# Patient Record
Sex: FEMALE | Race: BLACK OR AFRICAN AMERICAN | NOT HISPANIC OR LATINO | Employment: FULL TIME | URBAN - METROPOLITAN AREA
[De-identification: names, ages, dates, MRNs, and addresses within clinical notes are randomized per-mention and may not be internally consistent; named-entity substitution may affect disease eponyms.]

---

## 2017-08-03 ENCOUNTER — SEE NOTE (OUTPATIENT)
Dept: URBAN - METROPOLITAN AREA CLINIC 31 | Facility: CLINIC | Age: 47
Setting detail: DERMATOLOGY
End: 2017-08-03

## 2017-08-03 PROBLEM — L82.1 OTHER SEBORRHEIC KERATOSIS: Status: RESOLVED | Noted: 2017-08-03

## 2017-08-03 PROCEDURE — 99202 OFFICE O/P NEW SF 15 MIN: CPT

## 2019-10-21 ENCOUNTER — RASH (OUTPATIENT)
Dept: URBAN - METROPOLITAN AREA CLINIC 36 | Facility: CLINIC | Age: 49
Setting detail: DERMATOLOGY
End: 2019-10-21

## 2019-10-21 DIAGNOSIS — B35.3 TINEA PEDIS: ICD-10-CM

## 2019-10-21 DIAGNOSIS — Z79.899 OTHER LONG-TERM (CURRENT) DRUG THERAPY: ICD-10-CM

## 2019-10-21 PROBLEM — R21 RASH AND OTHER NONSPECIFIC SKIN ERUPTION: Status: RESOLVED | Noted: 2019-10-21

## 2019-10-21 PROCEDURE — 99213 OFFICE O/P EST LOW 20 MIN: CPT

## 2019-10-21 RX ORDER — CLOBETASOL PROPIONATE 0.5 MG/G
1 APPLICATION CREAM TOPICAL BID
Qty: 60 | Refills: 0
Start: 2019-10-21

## 2020-12-30 ENCOUNTER — OFFICE VISIT (OUTPATIENT)
Dept: URBAN - METROPOLITAN AREA CLINIC 27 | Facility: CLINIC | Age: 50
End: 2020-12-30

## 2020-12-31 PROBLEM — 429969003 FAMILY HISTORY OF POLYP OF COLON: Status: ACTIVE | Noted: 2020-12-31

## 2020-12-31 PROBLEM — 275978004 SCREENING FOR MALIGNANT NEOPLASM OF COLON: Status: ACTIVE | Noted: 2020-12-31

## 2021-01-06 ENCOUNTER — OFFICE VISIT (OUTPATIENT)
Dept: URBAN - METROPOLITAN AREA SURGERY CENTER 7 | Facility: SURGERY CENTER | Age: 51
End: 2021-01-06

## 2022-04-30 ENCOUNTER — TELEPHONE ENCOUNTER (OUTPATIENT)
Dept: URBAN - METROPOLITAN AREA CLINIC 121 | Facility: CLINIC | Age: 52
End: 2022-04-30

## 2022-05-01 ENCOUNTER — TELEPHONE ENCOUNTER (OUTPATIENT)
Dept: URBAN - METROPOLITAN AREA CLINIC 121 | Facility: CLINIC | Age: 52
End: 2022-05-01

## 2022-05-01 RX ORDER — CHOLINE 650 MG
TABLET ORAL
Status: ACTIVE | COMMUNITY
Start: 2020-12-30

## 2022-05-01 RX ORDER — MULTIVITAMIN
TABLET ORAL
Status: ACTIVE | COMMUNITY
Start: 2020-12-30

## 2022-05-01 RX ORDER — ZINC SULFATE 50(220)MG
CAPSULE ORAL
Status: ACTIVE | COMMUNITY
Start: 2020-12-30

## 2022-08-31 ENCOUNTER — OFFICE VISIT (OUTPATIENT)
Dept: URBAN - METROPOLITAN AREA CLINIC 26 | Facility: CLINIC | Age: 52
End: 2022-08-31
Payer: COMMERCIAL

## 2022-08-31 ENCOUNTER — LAB OUTSIDE AN ENCOUNTER (OUTPATIENT)
Dept: URBAN - METROPOLITAN AREA CLINIC 27 | Facility: CLINIC | Age: 52
End: 2022-08-31

## 2022-08-31 ENCOUNTER — TELEPHONE ENCOUNTER (OUTPATIENT)
Dept: URBAN - METROPOLITAN AREA CLINIC 27 | Facility: CLINIC | Age: 52
End: 2022-08-31

## 2022-08-31 ENCOUNTER — WEB ENCOUNTER (OUTPATIENT)
Dept: URBAN - METROPOLITAN AREA CLINIC 27 | Facility: CLINIC | Age: 52
End: 2022-08-31

## 2022-08-31 ENCOUNTER — DASHBOARD ENCOUNTERS (OUTPATIENT)
Age: 52
End: 2022-08-31

## 2022-08-31 ENCOUNTER — OFFICE VISIT (OUTPATIENT)
Dept: URBAN - METROPOLITAN AREA CLINIC 27 | Facility: CLINIC | Age: 52
End: 2022-08-31
Payer: COMMERCIAL

## 2022-08-31 VITALS
HEART RATE: 91 BPM | WEIGHT: 130 LBS | HEIGHT: 61 IN | DIASTOLIC BLOOD PRESSURE: 91 MMHG | SYSTOLIC BLOOD PRESSURE: 137 MMHG | BODY MASS INDEX: 24.55 KG/M2

## 2022-08-31 DIAGNOSIS — R11.2 NAUSEA & VOMITING: ICD-10-CM

## 2022-08-31 DIAGNOSIS — K30 DYSPEPSIA: ICD-10-CM

## 2022-08-31 DIAGNOSIS — R10.84 GENERALIZED ABDOMINAL PAIN: ICD-10-CM

## 2022-08-31 DIAGNOSIS — R19.7 DIARRHEA: ICD-10-CM

## 2022-08-31 DIAGNOSIS — K62.5 RECTAL BLEEDING: ICD-10-CM

## 2022-08-31 PROCEDURE — 83013 H PYLORI (C-13) BREATH: CPT | Performed by: INTERNAL MEDICINE

## 2022-08-31 PROCEDURE — 83014 H PYLORI DRUG ADMIN: CPT | Performed by: INTERNAL MEDICINE

## 2022-08-31 PROCEDURE — 99204 OFFICE O/P NEW MOD 45 MIN: CPT | Performed by: INTERNAL MEDICINE

## 2022-08-31 RX ORDER — MULTIVITAMIN
TABLET ORAL
Status: ACTIVE | COMMUNITY
Start: 2020-12-30

## 2022-08-31 RX ORDER — HYOSCYAMINE SULFATE 0.12 MG/1
1 OR 2 TABLETS AS NEEDED FOR ABDOMINAL PAIN TABLET ORAL EVERY 6 HOURS
Qty: 90 | Refills: 2 | OUTPATIENT
Start: 2022-08-31 | End: 2022-11-28

## 2022-08-31 RX ORDER — CHOLINE 650 MG
TABLET ORAL
Status: ACTIVE | COMMUNITY
Start: 2020-12-30

## 2022-08-31 RX ORDER — ONDANSETRON HYDROCHLORIDE 4 MG/1
1 TABLET TABLET, FILM COATED ORAL
Qty: 60 | Refills: 2 | OUTPATIENT

## 2022-08-31 RX ORDER — ZINC SULFATE 50(220)MG
CAPSULE ORAL
Status: ACTIVE | COMMUNITY
Start: 2020-12-30

## 2022-08-31 NOTE — HPI-TODAY'S VISIT:
Patient here with complaints of recent rectal bleeding. She had one episode of minor hematochezia on the toilet paper five days ago. This was apparently triggered by an episode of "food poisoning", with nausea/vomiting, gas/bloating, crampy abdominal pain and diarrhea. She denies any trigger for the symptoms, and these symptoms abated on their own after 12 to 16 hours. She apparently has had multiple similar episodes over the past few years, typically twice a year. She has not kept a food diary or a symptom log. The abdominal pain is sharp and is noted over the entire abdomen. It is sometimes nocturnal. The abdominal pain is usually associated with loose stools, bloating/gas and nausea/vomiting. She does not take anything for her symptoms aside from a probiotic. She does have a family history of gallbladder disease. She has not had any recent labs or imaging studies. She last underwent colonoscopy in early 2021; one adenoma was removed. Small internal hemorrhoids and left-sided diverticulosis were seen. She has not had a prior upper endoscopy. There is no family history of colon cancer, but her father had colon polyps.

## 2022-09-01 LAB
A/G RATIO: 1.4
ABSOLUTE BASOPHILS: 42
ABSOLUTE EOSINOPHILS: 100
ABSOLUTE LYMPHOCYTES: 2382
ABSOLUTE MONOCYTES: 490
ABSOLUTE NEUTROPHILS: 5287
ALBUMIN: 4.4
ALKALINE PHOSPHATASE: 100
ALT (SGPT): 10
AMYLASE: 59
AST (SGOT): 22
BASOPHILS: 0.5
BILIRUBIN, TOTAL: 0.8
BUN/CREATININE RATIO: (no result)
BUN: 10
C-REACTIVE PROTEIN, QUANT: 3
CALCIUM: 9.6
CARBON DIOXIDE, TOTAL: 27
CHLORIDE: 102
CREATININE: 0.83
EGFR: 84
EOSINOPHILS: 1.2
GLOBULIN, TOTAL: 3.1
GLUCOSE: 130
HEMATOCRIT: 35.8
HEMOGLOBIN: 12.6
LIPASE: 19
LYMPHOCYTES: 28.7
MCH: 31.3
MCHC: 35.2
MCV: 88.8
MONOCYTES: 5.9
MPV: 10
NEUTROPHILS: 63.7
PLATELET COUNT: 308
POTASSIUM: 4.2
PROTEIN, TOTAL: 7.5
RDW: 12.2
RED BLOOD CELL COUNT: 4.03
SED RATE BY MODIFIED: 2
SODIUM: 139
WHITE BLOOD CELL COUNT: 8.3

## 2022-09-30 ENCOUNTER — TELEPHONE ENCOUNTER (OUTPATIENT)
Dept: URBAN - METROPOLITAN AREA CLINIC 36 | Facility: CLINIC | Age: 52
End: 2022-09-30

## 2022-12-28 PROBLEM — 162031009 DYSPEPSIA: Status: ACTIVE | Noted: 2022-08-31

## 2025-02-10 ENCOUNTER — OFFICE VISIT (OUTPATIENT)
Dept: URBAN - METROPOLITAN AREA CLINIC 27 | Facility: CLINIC | Age: 55
End: 2025-02-10
Payer: COMMERCIAL

## 2025-02-10 ENCOUNTER — TELEPHONE ENCOUNTER (OUTPATIENT)
Dept: URBAN - METROPOLITAN AREA CLINIC 27 | Facility: CLINIC | Age: 55
End: 2025-02-10

## 2025-02-10 ENCOUNTER — LAB OUTSIDE AN ENCOUNTER (OUTPATIENT)
Dept: URBAN - METROPOLITAN AREA CLINIC 27 | Facility: CLINIC | Age: 55
End: 2025-02-10

## 2025-02-10 VITALS
SYSTOLIC BLOOD PRESSURE: 124 MMHG | HEART RATE: 83 BPM | WEIGHT: 136 LBS | BODY MASS INDEX: 25.68 KG/M2 | HEIGHT: 61 IN | DIASTOLIC BLOOD PRESSURE: 87 MMHG

## 2025-02-10 DIAGNOSIS — Z83.719 FAMILY HISTORY OF COLONIC POLYPS: ICD-10-CM

## 2025-02-10 DIAGNOSIS — Z86.0101 HISTORY OF ADENOMATOUS POLYP OF COLON: ICD-10-CM

## 2025-02-10 DIAGNOSIS — R15.0 INCOMPLETE DEFECATION: ICD-10-CM

## 2025-02-10 DIAGNOSIS — K62.5 RECTAL BLEEDING: ICD-10-CM

## 2025-02-10 PROCEDURE — 99203 OFFICE O/P NEW LOW 30 MIN: CPT | Performed by: INTERNAL MEDICINE

## 2025-02-10 RX ORDER — ZINC SULFATE 50(220)MG
CAPSULE ORAL
Status: ACTIVE | COMMUNITY
Start: 2020-12-30

## 2025-02-10 RX ORDER — CHOLINE 650 MG
TABLET ORAL
Status: ACTIVE | COMMUNITY
Start: 2020-12-30

## 2025-02-10 RX ORDER — POLYETHYLENE GLYCOL 3350, SODIUM SULFATE ANHYDROUS, SODIUM BICARBONATE, SODIUM CHLORIDE, POTASSIUM CHLORIDE 236; 22.74; 6.74; 5.86; 2.97 G/4L; G/4L; G/4L; G/4L; G/4L
4000 MILLITERS POWDER, FOR SOLUTION ORAL ONCE
Qty: 4000 MILLILITER | Refills: 0 | OUTPATIENT
Start: 2025-02-10 | End: 2025-02-11

## 2025-02-10 RX ORDER — MULTIVITAMIN
TABLET ORAL
Status: ACTIVE | COMMUNITY
Start: 2020-12-30

## 2025-02-10 RX ORDER — ONDANSETRON HYDROCHLORIDE 4 MG/1
1 TABLET TABLET, FILM COATED ORAL
Qty: 60 | Refills: 2 | Status: DISCONTINUED | COMMUNITY

## 2025-02-10 NOTE — HPI-TODAY'S VISIT:
Patient here with complaints of rectal bleeding that has occurred at least twice over the past few weeks. The first episode was painless. The second episode was associated with straining/passing a hard stool. She had some rectal pain during that episode. During both episodes, the rectal bleed was bright red, noted in the toilet water, and was small-volume. She did not use topical therapy. She denies diarrhea or constipation. She appears to have adequate water intake. She sometimes has the sensation of incomplete evacuation. She has no other GI symptoms currently. She has not had any recent labs. H. pylori breath testing was negative in 2022. She last underwent colonoscopy in early 2021; one adenoma was removed. Small internal hemorrhoids and left-sided diverticulosis were seen. She has not had a prior upper endoscopy. There is no family history of colon cancer, but her father and mother both had colon polyps.

## 2025-02-24 ENCOUNTER — CLAIMS CREATED FROM THE CLAIM WINDOW (OUTPATIENT)
Dept: URBAN - METROPOLITAN AREA SURGERY CENTER 7 | Facility: SURGERY CENTER | Age: 55
End: 2025-02-24
Payer: COMMERCIAL

## 2025-02-24 DIAGNOSIS — K62.5 HEMORRHAGE OF ANUS AND RECTUM: ICD-10-CM

## 2025-02-24 DIAGNOSIS — Z86.0101 PERSONAL HISTORY OF ADENOMATOUS AND SERRATED COLON POLYPS: ICD-10-CM

## 2025-02-24 DIAGNOSIS — Z86.0100 HISTORY OF COLON POLYPS: ICD-10-CM

## 2025-02-24 DIAGNOSIS — K57.30 DIVERTICULOSIS OF LARGE INTESTINE WITHOUT PERFORATION OR ABSCESS WITHOUT BLEEDING: ICD-10-CM

## 2025-02-24 PROCEDURE — 45378 DIAGNOSTIC COLONOSCOPY: CPT | Performed by: INTERNAL MEDICINE

## 2025-02-24 PROCEDURE — 00811 ANES LWR INTST NDSC NOS: CPT | Performed by: NURSE ANESTHETIST, CERTIFIED REGISTERED

## 2025-02-24 RX ORDER — MULTIVITAMIN
TABLET ORAL
Status: ACTIVE | COMMUNITY
Start: 2020-12-30

## 2025-02-24 RX ORDER — CHOLINE 650 MG
TABLET ORAL
Status: ACTIVE | COMMUNITY
Start: 2020-12-30

## 2025-02-24 RX ORDER — ZINC SULFATE 50(220)MG
CAPSULE ORAL
Status: ACTIVE | COMMUNITY
Start: 2020-12-30